# Patient Record
Sex: MALE | Race: WHITE
[De-identification: names, ages, dates, MRNs, and addresses within clinical notes are randomized per-mention and may not be internally consistent; named-entity substitution may affect disease eponyms.]

---

## 2017-01-18 ENCOUNTER — HOSPITAL ENCOUNTER (OUTPATIENT)
Dept: HOSPITAL 45 - C.LABMFLN | Age: 77
Discharge: HOME | End: 2017-01-18
Attending: FAMILY MEDICINE
Payer: COMMERCIAL

## 2017-01-18 DIAGNOSIS — I48.91: ICD-10-CM

## 2017-01-18 DIAGNOSIS — I10: Primary | ICD-10-CM

## 2017-01-18 DIAGNOSIS — I25.10: ICD-10-CM

## 2017-01-18 DIAGNOSIS — E11.9: ICD-10-CM

## 2017-01-18 DIAGNOSIS — E78.5: ICD-10-CM

## 2017-01-18 LAB
ALT SERPL-CCNC: 23 U/L (ref 12–78)
ANION GAP SERPL CALC-SCNC: 9 MMOL/L (ref 3–11)
AST SERPL-CCNC: 15 U/L (ref 15–37)
BUN SERPL-MCNC: 32 MG/DL (ref 7–18)
BUN/CREAT SERPL: 21.6 (ref 10–20)
CALCIUM SERPL-MCNC: 9 MG/DL (ref 8.5–10.1)
CHLORIDE SERPL-SCNC: 108 MMOL/L (ref 98–107)
CO2 SERPL-SCNC: 24 MMOL/L (ref 21–32)
CREAT SERPL-MCNC: 1.5 MG/DL (ref 0.6–1.4)
CREAT UR-MCNC: 160 MG/DL
EST. AVERAGE GLUCOSE BLD GHB EST-MCNC: 148 MG/DL
GLUCOSE SERPL-MCNC: 178 MG/DL (ref 70–99)
POTASSIUM SERPL-SCNC: 5 MMOL/L (ref 3.5–5.1)
RATIO: 113.1 MCG/MG (ref 0–30)
SODIUM SERPL-SCNC: 141 MMOL/L (ref 136–145)

## 2017-02-14 ENCOUNTER — HOSPITAL ENCOUNTER (OUTPATIENT)
Dept: HOSPITAL 45 - C.LABMFLN | Age: 77
Discharge: HOME | End: 2017-02-14
Attending: FAMILY MEDICINE
Payer: COMMERCIAL

## 2017-02-14 DIAGNOSIS — N18.3: Primary | ICD-10-CM

## 2017-02-14 LAB
ANION GAP SERPL CALC-SCNC: 10 MMOL/L (ref 3–11)
BUN SERPL-MCNC: 25 MG/DL (ref 7–18)
BUN/CREAT SERPL: 20.9 (ref 10–20)
CALCIUM SERPL-MCNC: 9.3 MG/DL (ref 8.5–10.1)
CHLORIDE SERPL-SCNC: 104 MMOL/L (ref 98–107)
CO2 SERPL-SCNC: 26 MMOL/L (ref 21–32)
CREAT SERPL-MCNC: 1.2 MG/DL (ref 0.6–1.4)
GLUCOSE SERPL-MCNC: 170 MG/DL (ref 70–99)
POTASSIUM SERPL-SCNC: 4.5 MMOL/L (ref 3.5–5.1)
SODIUM SERPL-SCNC: 140 MMOL/L (ref 136–145)

## 2017-06-30 ENCOUNTER — HOSPITAL ENCOUNTER (OUTPATIENT)
Dept: HOSPITAL 45 - C.LABMFLN | Age: 77
Discharge: HOME | End: 2017-06-30
Attending: FAMILY MEDICINE
Payer: COMMERCIAL

## 2017-06-30 DIAGNOSIS — M10.9: ICD-10-CM

## 2017-06-30 DIAGNOSIS — E11.9: ICD-10-CM

## 2017-06-30 DIAGNOSIS — I10: ICD-10-CM

## 2017-06-30 DIAGNOSIS — I25.10: Primary | ICD-10-CM

## 2017-06-30 LAB
ALP SERPL-CCNC: 118 U/L (ref 45–117)
ALT SERPL-CCNC: 29 U/L (ref 12–78)
ANION GAP SERPL CALC-SCNC: 7 MMOL/L (ref 3–11)
AST SERPL-CCNC: 17 U/L (ref 15–37)
BUN SERPL-MCNC: 34 MG/DL (ref 7–18)
BUN/CREAT SERPL: 24.3 (ref 10–20)
CALCIUM SERPL-MCNC: 9.8 MG/DL (ref 8.5–10.1)
CHLORIDE SERPL-SCNC: 108 MMOL/L (ref 98–107)
CO2 SERPL-SCNC: 26 MMOL/L (ref 21–32)
CREAT SERPL-MCNC: 1.4 MG/DL (ref 0.6–1.4)
EOSINOPHIL NFR BLD AUTO: 211 K/UL (ref 130–400)
EST. AVERAGE GLUCOSE BLD GHB EST-MCNC: 146 MG/DL
GLUCOSE SERPL-MCNC: 185 MG/DL (ref 70–99)
HCT VFR BLD CALC: 38.9 % (ref 42–52)
MCH RBC QN AUTO: 30.3 PG (ref 25–34)
MCHC RBC AUTO-ENTMCNC: 32.1 G/DL (ref 32–36)
MCV RBC AUTO: 94.4 FL (ref 80–100)
PMV BLD AUTO: 11 FL (ref 7.4–10.4)
POTASSIUM SERPL-SCNC: 4.6 MMOL/L (ref 3.5–5.1)
RBC # BLD AUTO: 4.12 M/UL (ref 4.7–6.1)
SODIUM SERPL-SCNC: 141 MMOL/L (ref 136–145)
URATE SERPL-MCNC: 4.9 MG/DL (ref 2.6–7.2)
WBC # BLD AUTO: 6.19 K/UL (ref 4.8–10.8)

## 2017-08-11 ENCOUNTER — HOSPITAL ENCOUNTER (OUTPATIENT)
Dept: HOSPITAL 45 - C.LABMFLN | Age: 77
Discharge: HOME | End: 2017-08-11
Attending: INTERNAL MEDICINE
Payer: COMMERCIAL

## 2017-08-11 DIAGNOSIS — D68.9: ICD-10-CM

## 2017-08-11 DIAGNOSIS — I48.91: ICD-10-CM

## 2017-08-11 DIAGNOSIS — I25.10: Primary | ICD-10-CM

## 2017-08-11 LAB
ALP SERPL-CCNC: 106 U/L (ref 45–117)
ALT SERPL-CCNC: 26 U/L (ref 12–78)
AST SERPL-CCNC: 17 U/L (ref 15–37)

## 2017-08-18 NOTE — CODING QUERY NO DIAGNOSIS
:  1940



TREATMENT RENDERED WITHOUT A DIAGNOSIS                                                  



To promote full compliance with coding requirements relating to patient care, physician 
participation is requested in all cases of  uncertainty.  Please assist us with 
providing a diagnosis/symptom for the test(s) below:



A diagnosis/symptom was not documented on your Order.  A valid diagnosis/symptom is 
required to bill all insurances.



**Please remember that we are unable to code a diagnosis of rule out, probable, possible, 
questionable, or suspected.  



Tests that require a diagnosis:

DOS:  16



* LIVER PROFILE      DIAGNOSIS:













Provider Signature: _____________________________ Date: _________



Thank you  

Ninoska Oleary

Health Information Management

Phone:  [phone]

Fax:  [phone]



Once completed, please kindly fax back to [phone]



For questions please call [phone]

## 2017-10-11 ENCOUNTER — HOSPITAL ENCOUNTER (OUTPATIENT)
Dept: HOSPITAL 45 - C.LABMFLN | Age: 77
Discharge: HOME | End: 2017-10-11
Attending: FAMILY MEDICINE
Payer: COMMERCIAL

## 2017-10-11 DIAGNOSIS — E11.9: ICD-10-CM

## 2017-10-11 DIAGNOSIS — N18.3: Primary | ICD-10-CM

## 2017-10-11 LAB
ALT SERPL-CCNC: 21 U/L (ref 12–78)
ANION GAP SERPL CALC-SCNC: 8 MMOL/L (ref 3–11)
BUN SERPL-MCNC: 35 MG/DL (ref 7–18)
BUN/CREAT SERPL: 26.8 (ref 10–20)
CALCIUM SERPL-MCNC: 9.2 MG/DL (ref 8.5–10.1)
CHLORIDE SERPL-SCNC: 109 MMOL/L (ref 98–107)
CO2 SERPL-SCNC: 26 MMOL/L (ref 21–32)
CREAT SERPL-MCNC: 1.3 MG/DL (ref 0.6–1.4)
CREAT UR-MCNC: 140 MG/DL
EST. AVERAGE GLUCOSE BLD GHB EST-MCNC: 154 MG/DL
GLUCOSE SERPL-MCNC: 147 MG/DL (ref 70–99)
POTASSIUM SERPL-SCNC: 4.3 MMOL/L (ref 3.5–5.1)
RATIO: 123.6 MCG/MG (ref 0–30)
SODIUM SERPL-SCNC: 143 MMOL/L (ref 136–145)

## 2018-03-05 ENCOUNTER — HOSPITAL ENCOUNTER (OUTPATIENT)
Dept: HOSPITAL 45 - C.LABMFLN | Age: 78
Discharge: HOME | End: 2018-03-05
Attending: FAMILY MEDICINE
Payer: COMMERCIAL

## 2018-03-05 DIAGNOSIS — I11.0: Primary | ICD-10-CM

## 2018-03-05 DIAGNOSIS — E78.5: ICD-10-CM

## 2018-03-05 DIAGNOSIS — E11.9: ICD-10-CM

## 2018-03-05 DIAGNOSIS — I50.9: ICD-10-CM

## 2018-03-05 DIAGNOSIS — M54.5: ICD-10-CM

## 2018-03-05 LAB
BUN SERPL-MCNC: 22 MG/DL (ref 7–18)
CALCIUM SERPL-MCNC: 9.3 MG/DL (ref 8.5–10.1)
CO2 SERPL-SCNC: 30 MMOL/L (ref 21–32)
CREAT SERPL-MCNC: 1.21 MG/DL (ref 0.6–1.4)
GLUCOSE SERPL-MCNC: 131 MG/DL (ref 70–99)
POTASSIUM SERPL-SCNC: 4 MMOL/L (ref 3.5–5.1)
SODIUM SERPL-SCNC: 139 MMOL/L (ref 136–145)

## 2018-03-06 LAB — HBA1C MFR BLD: 6.7 % (ref 4.5–5.6)
